# Patient Record
Sex: MALE | NOT HISPANIC OR LATINO | Employment: FULL TIME | URBAN - METROPOLITAN AREA
[De-identification: names, ages, dates, MRNs, and addresses within clinical notes are randomized per-mention and may not be internally consistent; named-entity substitution may affect disease eponyms.]

---

## 2022-06-02 ENCOUNTER — HOSPITAL ENCOUNTER (EMERGENCY)
Facility: CLINIC | Age: 42
Discharge: HOME | End: 2022-06-03
Attending: EMERGENCY MEDICINE | Admitting: EMERGENCY MEDICINE
Payer: COMMERCIAL

## 2022-06-02 DIAGNOSIS — T61.11XA UNINTENTIONAL SCOMBROID FISH POISONING, INITIAL ENCOUNTER: ICD-10-CM

## 2022-06-02 LAB
ANION GAP SERPL CALCULATED.3IONS-SCNC: 13 MMOL/L (ref 5–18)
BUN SERPL-MCNC: 13 MG/DL (ref 8–22)
CALCIUM SERPL-MCNC: 9.5 MG/DL (ref 8.5–10.5)
CHLORIDE BLD-SCNC: 102 MMOL/L (ref 98–107)
CO2 SERPL-SCNC: 24 MMOL/L (ref 22–31)
CREAT SERPL-MCNC: 1.28 MG/DL (ref 0.7–1.3)
ERYTHROCYTE [DISTWIDTH] IN BLOOD BY AUTOMATED COUNT: 13.4 % (ref 10–15)
GFR SERPL CREATININE-BSD FRML MDRD: 72 ML/MIN/1.73M2
GLUCOSE BLD-MCNC: 135 MG/DL (ref 70–125)
HCT VFR BLD AUTO: 45.1 % (ref 40–53)
HGB BLD-MCNC: 15.1 G/DL (ref 13.3–17.7)
MAGNESIUM SERPL-MCNC: 2 MG/DL (ref 1.8–2.6)
MCH RBC QN AUTO: 29.5 PG (ref 26.5–33)
MCHC RBC AUTO-ENTMCNC: 33.5 G/DL (ref 31.5–36.5)
MCV RBC AUTO: 88 FL (ref 78–100)
PLATELET # BLD AUTO: 290 10E3/UL (ref 150–450)
POTASSIUM BLD-SCNC: 3.8 MMOL/L (ref 3.5–5)
RBC # BLD AUTO: 5.11 10E6/UL (ref 4.4–5.9)
SODIUM SERPL-SCNC: 139 MMOL/L (ref 136–145)
TSH SERPL DL<=0.005 MIU/L-ACNC: 2.88 UIU/ML (ref 0.3–5)
WBC # BLD AUTO: 8 10E3/UL (ref 4–11)

## 2022-06-02 PROCEDURE — 258N000003 HC RX IP 258 OP 636: Performed by: EMERGENCY MEDICINE

## 2022-06-02 PROCEDURE — 85014 HEMATOCRIT: CPT | Performed by: EMERGENCY MEDICINE

## 2022-06-02 PROCEDURE — 96374 THER/PROPH/DIAG INJ IV PUSH: CPT

## 2022-06-02 PROCEDURE — 36415 COLL VENOUS BLD VENIPUNCTURE: CPT | Performed by: EMERGENCY MEDICINE

## 2022-06-02 PROCEDURE — 96361 HYDRATE IV INFUSION ADD-ON: CPT

## 2022-06-02 PROCEDURE — 83735 ASSAY OF MAGNESIUM: CPT | Performed by: EMERGENCY MEDICINE

## 2022-06-02 PROCEDURE — 84443 ASSAY THYROID STIM HORMONE: CPT | Performed by: EMERGENCY MEDICINE

## 2022-06-02 PROCEDURE — 80048 BASIC METABOLIC PNL TOTAL CA: CPT | Performed by: EMERGENCY MEDICINE

## 2022-06-02 PROCEDURE — 250N000011 HC RX IP 250 OP 636: Performed by: EMERGENCY MEDICINE

## 2022-06-02 PROCEDURE — 99284 EMERGENCY DEPT VISIT MOD MDM: CPT | Mod: 25

## 2022-06-02 RX ORDER — DIPHENHYDRAMINE HCL 25 MG
25 CAPSULE ORAL EVERY 6 HOURS PRN
Qty: 20 CAPSULE | Refills: 0 | Status: SHIPPED | OUTPATIENT
Start: 2022-06-02

## 2022-06-02 RX ORDER — DIPHENHYDRAMINE HYDROCHLORIDE 50 MG/ML
25 INJECTION INTRAMUSCULAR; INTRAVENOUS ONCE
Status: COMPLETED | OUTPATIENT
Start: 2022-06-02 | End: 2022-06-02

## 2022-06-02 RX ADMIN — SODIUM CHLORIDE 1000 ML: 9 INJECTION, SOLUTION INTRAVENOUS at 22:45

## 2022-06-02 RX ADMIN — DIPHENHYDRAMINE HYDROCHLORIDE 25 MG: 50 INJECTION, SOLUTION INTRAMUSCULAR; INTRAVENOUS at 22:47

## 2022-06-02 ASSESSMENT — ENCOUNTER SYMPTOMS
PALPITATIONS: 1
SORE THROAT: 0
NAUSEA: 1
COLOR CHANGE: 1
RHINORRHEA: 0
FEVER: 0
SHORTNESS OF BREATH: 0
BLOOD IN STOOL: 0
VOMITING: 0
COUGH: 0
DIARRHEA: 1
ABDOMINAL PAIN: 0
MYALGIAS: 0

## 2022-06-03 VITALS
HEART RATE: 75 BPM | OXYGEN SATURATION: 97 % | WEIGHT: 260 LBS | SYSTOLIC BLOOD PRESSURE: 137 MMHG | TEMPERATURE: 97.4 F | BODY MASS INDEX: 36.4 KG/M2 | HEIGHT: 71 IN | DIASTOLIC BLOOD PRESSURE: 75 MMHG | RESPIRATION RATE: 22 BRPM

## 2022-06-03 NOTE — ED PROVIDER NOTES
Emergency Department Encounter      NAME: Oneil Krause  AGE: 41 year old male  YOB: 1980  MRN: [id number]  EVALUATION DATE & TIME: 6/2/2022  9:54 PM    PCP: No Ref-Primary, Physician    ED PROVIDER: Henry Steward M.D.      Chief Complaint   Patient presents with     Palpitations         FINAL IMPRESSION:  1. Unintentional scombroid fish poisoning, initial encounter        MEDICAL DECISION MAKING:    10:16 PM I met with the patient, obtained history, performed an initial exam, and discussed options and plan for diagnostics and treatment here in the ED. PPE worn: surgical mask.   11:15 PM I rechecked and updated the patient.     This patient is a 41-year-old male who presents after a episode that happened 30 minutes after he finished eating some tuna steak that his wife had cooked.  He said that he ate around 730 and at 8 PM he began to feel flushed.  He said he has been nauseous, felt unwell and had 4 episodes of watery diarrhea.  He said that he felt like he had some heart palpitations like his heart was racing.  He says that he has been feeling better since that have been but still is symptomatic.  He was tachycardic when I saw him initially.  He was concerned as he had checked his sats at home on his wife's pulse oximeter and it read 88 to 93%.  However in the ER he was satting in the high 90s and did not have any respiratory distress.  His lungs were clear to auscultation.  He did not have any cough or sputum.  No COVID symptoms.  After discovering that he had eaten a tuna steak before the symptoms started and it was clear that he was describing a scombroid poisoning.  He was given some IV fluid as well as IV Benadryl in the ER.  He felt better.  I discussed the symptoms of scombroid with him and he was able to look up on the Internet as well.  He was encouraged to get rid of the tingling that he had remaining in his kitchen and contact the place where he had bought it from so that they was  not selling it.    Pertinent Labs & Imaging studies reviewed. (See chart for details)    The importance of close follow up was discussed. We reviewed warning signs and symptoms, and I instructed Mr. Krause to return to the emergency department immediately if he develops any new or worsening symptoms. I provided additional verbal discharge instructions. Mr. Krause expressed understanding and agreement with this plan of care, his questions were answered, and he was discharged in stable condition.       MEDICATIONS GIVEN IN THE EMERGENCY:  Medications   0.9% sodium chloride BOLUS (1,000 mLs Intravenous New Bag 6/2/22 2245)   diphenhydrAMINE (BENADRYL) injection 25 mg (25 mg Intravenous Given 6/2/22 2247)       NEW PRESCRIPTIONS STARTED AT TODAY'S ER VISIT:  New Prescriptions    No medications on file          =================================================================    HPI    Patient information was obtained from: Patient     Use of : N/A         Oneil Krause is a 41 year old male with a past medical history of obesity, who presents for evaluation of palpitations.     Patient reports that around 8 PM this evening he had a sudden episode where he felt hot and flushed and he began to experience palpitations. He describes feeling a rapid heart beat like his heart was fluttering. Around this same time he also developed diarrhea and nausea. He reports that he had 4 episodes of watery, non-bloody diarrhea in a period of 30 minutes. He also checked his O2 sats at home and noted that they were in the range of 88-93%. He denies shortness of breath. Patient notes that prior to onset of his symptoms this evening, he had eaten a tuna steak for dinner after getting home from work around 7:30 PM; he had never eaten this before. No reported recent travel. Patient also adds that he is currently on an oral antibiotic for a staph infection of his eyelid. He otherwise denies fever, abdominal pain, vomiting, cough,  "runny nose, congestion, sore throat, body aches, or additional symptoms or complaints at this time.     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       REVIEW OF SYSTEMS   Review of Systems   Constitutional: Negative for fever.   HENT: Negative for congestion, rhinorrhea and sore throat.    Respiratory: Negative for cough and shortness of breath.    Cardiovascular: Positive for palpitations.   Gastrointestinal: Positive for diarrhea and nausea. Negative for abdominal pain, blood in stool and vomiting.   Musculoskeletal: Negative for myalgias.   Skin: Positive for color change (feeling flushed).   All other systems reviewed and are negative.       PAST MEDICAL HISTORY:  History reviewed. No pertinent past medical history.    PAST SURGICAL HISTORY:  Past Surgical History:   Procedure Laterality Date     ORTHOPEDIC SURGERY      Right ankle surgery 1996     ORTHOPEDIC SURGERY      Right thumb surgery 2000       CURRENT MEDICATIONS:      Current Facility-Administered Medications:      0.9% sodium chloride BOLUS, 1,000 mL, Intravenous, Once, Henry Steward MD, Last Rate: 1,000 mL/hr at 06/02/22 2245, 1,000 mL at 06/02/22 2245    Current Outpatient Medications:      oxycodone-acetaminophen (PERCOCET) 5-325 MG per tablet, Take 1-2 tablets by mouth every 6 hours as needed for pain., Disp: 15 tablet, Rfl: 0     Pseudoeph-Doxylamine-DM-APAP (NYQUIL PO), Take 2 tablets by mouth as needed., Disp: , Rfl:     ALLERGIES:  No Known Allergies    FAMILY HISTORY:  History reviewed. No pertinent family history.    SOCIAL HISTORY:   Social History     Socioeconomic History     Marital status: Single   Substance and Sexual Activity     Alcohol use: Yes     Comment: Seldon drinks alcohol     Drug use: No       PHYSICAL EXAM:    Vitals: BP (!) 141/72   Pulse 86   Temp 97.4  F (36.3  C) (Oral)   Resp 20   Ht 1.803 m (5' 11\")   Wt 117.9 kg (260 lb)   SpO2 95%   BMI 36.26 kg/m   "   Constitutional: Well developed, well nourished. Comfortable appearing.  HEAD:Normocephalic, atraumatic,   Eyes: PERRLA, EOM intact, conjunctiva clear, no discharge  ENT: tacky mucous membranes, nose normal.   Neck- Supple, gross ROM intact.  No JVD.  No palpable nodes.  Pulmonary: Clear to auscultation bilaterally, no respiratory distress, no wheezing, speaks full sentences easily.  Chest: No chest wall tenderness  Cardiovascular: Tachycardic, regular rhythm, no murmurs. No lower extremity edema, 2+ DP pulses.   GI: Soft, no tenderness to deep palpation in all quadrants, not distended, no masses.  No hepatosplenomegaly.  Musculoskeletal: Moving all 4 extremities intentionally and without pain. No obvious deformity.  Back: No CVA tenderness  Skin: Warm, dry, no rash.  Neurologic: Alert & oriented x 3, speech clear, moving all extremities spontaneously   Psychiatric: Affect normal, cooperative.     LAB:  All pertinent labs reviewed and interpreted.  Labs Ordered and Resulted from Time of ED Arrival to Time of ED Departure   BASIC METABOLIC PANEL - Abnormal       Result Value    Sodium 139      Potassium 3.8      Chloride 102      Carbon Dioxide (CO2) 24      Anion Gap 13      Urea Nitrogen 13      Creatinine 1.28      Calcium 9.5      Glucose 135 (*)     GFR Estimate 72     CBC WITH PLATELETS - Normal    WBC Count 8.0      RBC Count 5.11      Hemoglobin 15.1      Hematocrit 45.1      MCV 88      MCH 29.5      MCHC 33.5      RDW 13.4      Platelet Count 290     MAGNESIUM - Normal    Magnesium 2.0     TSH - Normal    TSH 2.88         RADIOLOGY:  No orders to display         Jenna BOLDEN, am serving as a scribe to document services personally performed by Dr. Henry Steward based on my observation and the provider's statements to me. Henry BOLDEN M.D. attest that Jenna Chery is acting in a scribe capacity, has observed my performance of the services and has documented them in accordance with my  direction.      Henry Steward M.D.  Emergency Medicine  [facility] EMERGENCY ROOM  [address] [id number] [phone]  Dept: [phone]     Henry Steward MD  06/03/22 0447

## 2022-06-03 NOTE — DISCHARGE INSTRUCTIONS
Use over-the-counter Imodium for the diarrhea as long as you do not have a fever or bloody diarrhea.    Do not eat any more of the tuna and you may want to inform the store that bought it from as well.

## 2022-06-03 NOTE — ED TRIAGE NOTES
"Around 2000 pt noted palpitations, with skin flushing feeling and \"odd sensation\" in chest, pt has oximeter at home and noted sats 88-93 at home.  98-99% in triage.  Pt afebrile but skin feels hot to touch and face is flushed.  Pt reports heart feels a \"little slower now\"     Triage Assessment     Row Name 06/02/22 8105       Triage Assessment (Adult)    Airway WDL WDL       Respiratory WDL    Respiratory WDL X;rhythm/pattern    Rhythm/Pattern, Respiratory pattern regular;shortness of breath       Skin Circulation/Temperature WDL    Skin Circulation/Temperature WDL temperature;X    Skin Temperature warm       Cardiac WDL    Cardiac WDL X;rhythm;chest pain    Cardiac Rhythm tachycardic       Peripheral/Neurovascular WDL    Peripheral Neurovascular WDL WDL       Cognitive/Neuro/Behavioral WDL    Cognitive/Neuro/Behavioral WDL WDL              "

## 2022-06-06 LAB
ATRIAL RATE - MUSE: 115 BPM
DIASTOLIC BLOOD PRESSURE - MUSE: NORMAL MMHG
INTERPRETATION ECG - MUSE: NORMAL
P AXIS - MUSE: 56 DEGREES
PR INTERVAL - MUSE: 136 MS
QRS DURATION - MUSE: 102 MS
QT - MUSE: 344 MS
QTC - MUSE: 475 MS
R AXIS - MUSE: 81 DEGREES
SYSTOLIC BLOOD PRESSURE - MUSE: NORMAL MMHG
T AXIS - MUSE: 0 DEGREES
VENTRICULAR RATE- MUSE: 115 BPM

## 2025-05-02 ENCOUNTER — HOSPITAL ENCOUNTER (EMERGENCY)
Facility: CLINIC | Age: 45
Discharge: HOME | End: 2025-05-02
Attending: EMERGENCY MEDICINE | Admitting: EMERGENCY MEDICINE

## 2025-05-02 ENCOUNTER — APPOINTMENT (OUTPATIENT)
Dept: CT IMAGING | Facility: CLINIC | Age: 45
End: 2025-05-02
Attending: EMERGENCY MEDICINE

## 2025-05-02 VITALS
WEIGHT: 276 LBS | SYSTOLIC BLOOD PRESSURE: 171 MMHG | TEMPERATURE: 97 F | BODY MASS INDEX: 38.49 KG/M2 | RESPIRATION RATE: 18 BRPM | HEART RATE: 87 BPM | DIASTOLIC BLOOD PRESSURE: 95 MMHG | OXYGEN SATURATION: 99 %

## 2025-05-02 DIAGNOSIS — S09.90XA CLOSED HEAD INJURY, INITIAL ENCOUNTER: ICD-10-CM

## 2025-05-02 PROCEDURE — 250N000011 HC RX IP 250 OP 636: Performed by: EMERGENCY MEDICINE

## 2025-05-02 PROCEDURE — 70450 CT HEAD/BRAIN W/O DYE: CPT

## 2025-05-02 PROCEDURE — 99284 EMERGENCY DEPT VISIT MOD MDM: CPT | Mod: 25 | Performed by: EMERGENCY MEDICINE

## 2025-05-02 PROCEDURE — 250N000013 HC RX MED GY IP 250 OP 250 PS 637: Performed by: EMERGENCY MEDICINE

## 2025-05-02 RX ORDER — ACETAMINOPHEN 325 MG/1
975 TABLET ORAL ONCE
Status: COMPLETED | OUTPATIENT
Start: 2025-05-02 | End: 2025-05-02

## 2025-05-02 RX ORDER — ONDANSETRON 2 MG/ML
4 INJECTION INTRAMUSCULAR; INTRAVENOUS ONCE
Status: DISCONTINUED | OUTPATIENT
Start: 2025-05-02 | End: 2025-05-02

## 2025-05-02 RX ORDER — ONDANSETRON 4 MG/1
4 TABLET, ORALLY DISINTEGRATING ORAL ONCE
Status: COMPLETED | OUTPATIENT
Start: 2025-05-02 | End: 2025-05-02

## 2025-05-02 RX ADMIN — ACETAMINOPHEN 975 MG: 325 TABLET, FILM COATED ORAL at 16:32

## 2025-05-02 RX ADMIN — ONDANSETRON 4 MG: 4 TABLET, ORALLY DISINTEGRATING ORAL at 16:17

## 2025-05-02 ASSESSMENT — ACTIVITIES OF DAILY LIVING (ADL)
ADLS_ACUITY_SCORE: 41

## 2025-05-02 ASSESSMENT — COLUMBIA-SUICIDE SEVERITY RATING SCALE - C-SSRS
6. HAVE YOU EVER DONE ANYTHING, STARTED TO DO ANYTHING, OR PREPARED TO DO ANYTHING TO END YOUR LIFE?: NO
2. HAVE YOU ACTUALLY HAD ANY THOUGHTS OF KILLING YOURSELF IN THE PAST MONTH?: NO
1. IN THE PAST MONTH, HAVE YOU WISHED YOU WERE DEAD OR WISHED YOU COULD GO TO SLEEP AND NOT WAKE UP?: NO
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
## 2025-05-02 NOTE — DISCHARGE INSTRUCTIONS
Rest your eyes/brain as much as possible, including avoiding cell phone use, reading, TV.  Follow up with primary clinic.  Take zofran for nausea.  Ok to take tylenol/ibuprofen for pain.  Most concussions resolve over a week or so, but some can last longer, so follow up if not improving.
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
